# Patient Record
Sex: FEMALE | Race: BLACK OR AFRICAN AMERICAN | NOT HISPANIC OR LATINO | Employment: FULL TIME | URBAN - METROPOLITAN AREA
[De-identification: names, ages, dates, MRNs, and addresses within clinical notes are randomized per-mention and may not be internally consistent; named-entity substitution may affect disease eponyms.]

---

## 2024-10-28 ENCOUNTER — OFFICE VISIT (OUTPATIENT)
Dept: PRIMARY CARE | Facility: CLINIC | Age: 54
End: 2024-10-28
Payer: COMMERCIAL

## 2024-10-28 VITALS
BODY MASS INDEX: 31.3 KG/M2 | OXYGEN SATURATION: 100 % | DIASTOLIC BLOOD PRESSURE: 80 MMHG | SYSTOLIC BLOOD PRESSURE: 138 MMHG | WEIGHT: 199.4 LBS | HEART RATE: 64 BPM | HEIGHT: 67 IN | TEMPERATURE: 97.5 F

## 2024-10-28 DIAGNOSIS — M25.512 ACUTE PAIN OF LEFT SHOULDER: ICD-10-CM

## 2024-10-28 DIAGNOSIS — I10 BENIGN ESSENTIAL HYPERTENSION: ICD-10-CM

## 2024-10-28 DIAGNOSIS — S46.912D MUSCLE STRAIN OF LEFT UPPER ARM, SUBSEQUENT ENCOUNTER: Primary | ICD-10-CM

## 2024-10-28 PROCEDURE — 99213 OFFICE O/P EST LOW 20 MIN: CPT | Performed by: NURSE PRACTITIONER

## 2024-10-28 PROCEDURE — 3075F SYST BP GE 130 - 139MM HG: CPT | Performed by: NURSE PRACTITIONER

## 2024-10-28 PROCEDURE — 3008F BODY MASS INDEX DOCD: CPT | Performed by: NURSE PRACTITIONER

## 2024-10-28 PROCEDURE — 3079F DIAST BP 80-89 MM HG: CPT | Performed by: NURSE PRACTITIONER

## 2024-10-28 RX ORDER — CYCLOBENZAPRINE HCL 10 MG
10 TABLET ORAL NIGHTLY PRN
Qty: 30 TABLET | Refills: 0 | Status: SHIPPED | OUTPATIENT
Start: 2024-10-28 | End: 2025-01-28 | Stop reason: ALTCHOICE

## 2024-10-28 RX ORDER — MELOXICAM 15 MG/1
15 TABLET ORAL DAILY
Qty: 30 TABLET | Refills: 1 | Status: SHIPPED | OUTPATIENT
Start: 2024-10-28 | End: 2024-12-27

## 2024-10-28 ASSESSMENT — PAIN SCALES - GENERAL: PAINLEVEL_OUTOF10: 8

## 2024-10-28 NOTE — PROGRESS NOTES
"Subjective   Patient ID: Zaria Grimm is a 54 y.o. female who presents for Arm Pain (When it started: Was mentioned at last visit/Where pain is located:  L arm/Sharp, dull, burning, or pressure-like: Aching/Constant or intermittent: constant/Affecting work/activities: Hard to move sometimes, affecting things like changing clothes/What makes it worse or better: nothing helps).    Arm Pain     Presents for left upper arm and shoulder pain x week  Denies any injury or overuse  Denies any CP, SOB, radiating neck, jaw pain  Is not exertional, feels with ROM.    Review of Systems    Objective     /80 (BP Location: Right arm, Patient Position: Sitting, BP Cuff Size: Large adult)   Pulse 64   Temp 36.4 °C (97.5 °F) (Temporal)   Ht 1.702 m (5' 7\")   Wt 90.4 kg (199 lb 6.4 oz)   LMP 06/20/2022   SpO2 100%   BMI 31.23 kg/m²      Physical Exam  Vitals and nursing note reviewed.   Constitutional:       General: She is not in acute distress.     Appearance: Normal appearance.   Musculoskeletal:      Right shoulder: Normal.      Left shoulder: Swelling (posterior shoulder) and tenderness present. No bony tenderness. Decreased range of motion (due to pain). Normal strength.      Right upper arm: Normal.      Left upper arm: Tenderness present.      Right elbow: Normal.      Left elbow: Normal.      Cervical back: Normal.          Assessment/Plan   Diagnoses and all orders for this visit:  Muscle strain of left upper arm, subsequent encounter  -     Referral to Physical Therapy; Future  -     meloxicam (Mobic) 15 mg tablet; Take 1 tablet (15 mg) by mouth once daily.  Acute pain of left shoulder  -     Referral to Physical Therapy; Future  -     meloxicam (Mobic) 15 mg tablet; Take 1 tablet (15 mg) by mouth once daily.  Benign essential hypertension      Patient Instructions   Refer to Physical therapy for further evaluation and management  Start meloxicam and muscle relaxer as needed for pain and swelling in left arm " and shoulder  Time off work to rest the shoulder/arm this week  Continue to go through range of motion with elbow and shoulder until evaluated by PT.  Follow up with any changes or persisting symptoms

## 2024-10-28 NOTE — PATIENT INSTRUCTIONS
Refer to Physical therapy for further evaluation and management  Start meloxicam and muscle relaxer as needed for pain and swelling in left arm and shoulder  Time off work to rest the shoulder/arm this week  Continue to go through range of motion with elbow and shoulder until evaluated by PT.  Follow up with any changes or persisting symptoms

## 2024-10-28 NOTE — LETTER
October 28, 2024     Patient: Zaria Grimm   YOB: 1970   Date of Visit: 10/28/2024       To Whom It May Concern:    Zaria Grimm was seen in my clinic on 10/28/2024. Please excuse Zaria for her absence from work 10/28/2024 to 11/3/2024. She may return to work 11/4/2024.    If you have any questions or concerns, please don't hesitate to call.         Sincerely,         VONNIE Hoyt-CNP        CC: No Recipients

## 2024-10-29 DIAGNOSIS — M25.512 ACUTE PAIN OF LEFT SHOULDER: ICD-10-CM

## 2024-10-29 DIAGNOSIS — S46.912D MUSCLE STRAIN OF LEFT UPPER ARM, SUBSEQUENT ENCOUNTER: Primary | ICD-10-CM

## 2024-11-11 ENCOUNTER — EVALUATION (OUTPATIENT)
Dept: OCCUPATIONAL THERAPY | Facility: CLINIC | Age: 54
End: 2024-11-11
Payer: COMMERCIAL

## 2024-11-11 DIAGNOSIS — S46.912D MUSCLE STRAIN OF LEFT UPPER ARM, SUBSEQUENT ENCOUNTER: ICD-10-CM

## 2024-11-11 DIAGNOSIS — R29.898 DECREASED GRIP STRENGTH OF LEFT HAND: ICD-10-CM

## 2024-11-11 DIAGNOSIS — M25.512 ACUTE PAIN OF LEFT SHOULDER: ICD-10-CM

## 2024-11-11 DIAGNOSIS — M25.612 DECREASED ROM OF LEFT SHOULDER: Primary | ICD-10-CM

## 2024-11-11 PROBLEM — S46.912A MUSCLE STRAIN OF LEFT UPPER ARM: Status: ACTIVE | Noted: 2024-11-11

## 2024-11-11 PROCEDURE — 97165 OT EVAL LOW COMPLEX 30 MIN: CPT | Mod: GO

## 2024-11-11 PROCEDURE — 97110 THERAPEUTIC EXERCISES: CPT | Mod: GO

## 2024-11-11 ASSESSMENT — PAIN - FUNCTIONAL ASSESSMENT: PAIN_FUNCTIONAL_ASSESSMENT: 0-10

## 2024-11-11 ASSESSMENT — ENCOUNTER SYMPTOMS
OCCASIONAL FEELINGS OF UNSTEADINESS: 0
LOSS OF SENSATION IN FEET: 0
DEPRESSION: 0

## 2024-11-11 ASSESSMENT — PAIN SCALES - GENERAL: PAINLEVEL_OUTOF10: 3

## 2024-11-11 NOTE — PROGRESS NOTES
Occupational Therapy    Occupational Therapy Evaluation    Name: Zaria Grimm  MRN: [id number]  : 1970  Date: 24      Time Calculation  Start Time: 445  Stop Time: 530  Time Calculation (min): 45 min  OT Evaluation Time Entry  OT Evaluation (Low) Time Entry: 30     OT Therapeutic Procedures Time Entry  Therapeutic Exercise Time Entry: 15              Visit: 1  [facility]  Auth required  Problem List Items Addressed This Visit             ICD-10-CM    Decreased  strength of left hand R29.898    Relevant Orders    Follow Up In Occupational Therapy    Decreased ROM of left shoulder - Primary M25.612    Relevant Orders    Follow Up In Occupational Therapy    Acute pain of left shoulder M25.512    Relevant Orders    Follow Up In Occupational Therapy    Muscle strain of left upper arm S46.912A    Relevant Orders    Follow Up In Occupational Therapy     Assessment:   Patient is a 53 year old female who came to outpatient OT with dx of muscle strain of the LUE. Patient does demonstrate with tightness and TTP over dorsal aspect of L forearm and over biceps. Patient also demonstrates with decreased L shoulder ROM and overall strength impacting patient's abilities to carry out daily tasks. Patient would benefit from skilled OT to address the above impairments to maximize patient's functional abilities.     Plan:   OT POC to include: heat/ice, ultrasound, manual therapy, therapeutic exercise, therapeutic activity, HEP    1-2x  a week for 6-8 weeks  Rehab potential: Good  Patient in agreement with plan     Subjective  Patient agreeable to OT evaluation. Patient reports L arm pain began in September. Patient does not exactly know how it happened, but does report lifting heavier items at work (boxes) and stated could possibly have been from lifting (with pronated forearms.)    Current Problem:  1. Decreased ROM of left shoulder  Follow Up In Occupational Therapy      2. Muscle strain of left upper arm,  "subsequent encounter  Referral to Occupational Therapy    Follow Up In Occupational Therapy      3. Acute pain of left shoulder  Referral to Occupational Therapy    Follow Up In Occupational Therapy      4. Decreased  strength of left hand  Follow Up In Occupational Therapy        General:      General  Reason for Referral: OT eval and treat  Referred By:  (Juliette Reeder, APRN-CNP)  Preferred Learning Style: verbal, visual, written  Per Juliette Reeder's note on 10/28/24:  “Zaria Grimm is a 53 y.o. female who presents for Arm Pain (When it started: Was mentioned at last visit/Where pain is located: L arm/Sharp, dull, burning, or pressure-like: Aching/Constant or intermittent: constant/Affecting work/activities: Hard to move sometimes, affecting things like changing clothes/What makes it worse or better: nothing helps).  Patient Instructions   Refer to Physical therapy for further evaluation and management  Start meloxicam and muscle relaxer as needed for pain and swelling in left arm and shoulder  Time off work to rest the shoulder/arm this week  Continue to go through range of motion with elbow and shoulder until evaluated by PT.  Follow up with any changes or persisting symptoms”  Precautions:  Precautions  STEADI Fall Risk Score (The score of 4 or more indicates an increased risk of falling): 1  Vital Signs:   Not taken   Pain Assessment:  Pain Assessment  Pain Assessment: 0-10  0-10 (Numeric) Pain Score: 3  Pain in the LUE can get up to 7/10 at night and wake her up   Middle of the day can be a 6/10 after a lot of work and movement.   Work History:  Supervisor   Prior Level of Function:  Independent   Roles/Routines:  Likes shopping   Patient Goal:  \"To yoselyn rid of the pain\"  Personal Factors that my impact Care:   Works full time  Objective   Observation:  Pain in bicep area and goes down elbow into forearm on the dorsal aspect of the arm.  R handed  Palpation:  TTP over L bicep and dorsal aspect of L " forearm  ROM  RUE:   shoulder flexion: 0-155        shoulder abduction:   0-155             External rotation: 0-70  Internal rotation: T8  LUE:   shoulder flexion: 0- 110   shoulder abduction: 0-109        External rotation: 0-52  Internal rotation: sacrum    Strength:  RUE:   5/5  R : 45#  LUE:   shoulder flexion: 4-/5  shoulder abduction: 4-/5   External rotation: 4-/5  Internal rotation: 3+/5  : 23#  Coordination:  Intact   Sensation:  Denies parathesias   Outcome Measures:  OT Adult Other Outcome Measures  Other Outcome Measures: 24 (Quick Dash:) 29.55%    OP EDUCATION:/Treatment   Educated on modalities (heat vs ice). Educated on and completed gentle ROM stretches for dorsal aspect of L forearm, as well as gentle bicep stretch. Patient was also educated on shoulder flexion towel slide.   If patient has to lift at work, I instructed patient to lift items with a supinated forearm vs pronated forearm.    Goals:  Active       OT Goals       HEP       Start:  11/11/24    Expected End:  01/30/25       Patient will be independent with established HEP         Pain       Start:  11/11/24    Expected End:  01/30/25       Patient will report a decrease in LUE pain at nighttime (3/10 or less)         ROM       Start:  11/11/24    Expected End:  01/30/25       Patient will demonstrate increased L shoulder ROM with goniometer at 140 degrees or more of shoulder flexion and abduction, 60 degrees or more of external rotation, and internal rotation at T11 or better         Quick Dash       Start:  11/11/24    Expected End:  01/30/25       Patient will report an increase in LUE functional mobility as evident by having a 10% decrease on Quick Dash

## 2024-12-30 ENCOUNTER — DOCUMENTATION (OUTPATIENT)
Dept: OCCUPATIONAL THERAPY | Facility: CLINIC | Age: 54
End: 2024-12-30
Payer: COMMERCIAL

## 2024-12-30 NOTE — PROGRESS NOTES
Occupational Therapy    Discharge Summary    Name: Zaria Grimm  MRN: [id number]  : 1970  Date: 24    Discharge Summary: OT    Discharge Information: Date of discharge 24, Date of last visit 24, Date of evaluation 24, Number of attended visits 1, Referred by MANUEL Hoyt, and Referred for OT eval and treat; L arm pain      Rehab Discharge Reason: Patient did not call or schedule follow ups after her initial eval on 24. Will discharge the chart at this time.

## 2025-01-28 ENCOUNTER — APPOINTMENT (OUTPATIENT)
Dept: PRIMARY CARE | Facility: CLINIC | Age: 55
End: 2025-01-28
Payer: COMMERCIAL

## 2025-01-28 VITALS
HEART RATE: 78 BPM | TEMPERATURE: 97.3 F | BODY MASS INDEX: 31.23 KG/M2 | DIASTOLIC BLOOD PRESSURE: 78 MMHG | OXYGEN SATURATION: 98 % | HEIGHT: 67 IN | SYSTOLIC BLOOD PRESSURE: 124 MMHG | WEIGHT: 199 LBS

## 2025-01-28 DIAGNOSIS — E87.6 HYPOKALEMIA: Chronic | ICD-10-CM

## 2025-01-28 DIAGNOSIS — E66.811 CLASS 1 OBESITY DUE TO EXCESS CALORIES WITH SERIOUS COMORBIDITY AND BODY MASS INDEX (BMI) OF 31.0 TO 31.9 IN ADULT: ICD-10-CM

## 2025-01-28 DIAGNOSIS — E66.09 CLASS 1 OBESITY DUE TO EXCESS CALORIES WITH SERIOUS COMORBIDITY AND BODY MASS INDEX (BMI) OF 31.0 TO 31.9 IN ADULT: ICD-10-CM

## 2025-01-28 DIAGNOSIS — Z90.710 ACQUIRED ABSENCE OF BOTH CERVIX AND UTERUS: ICD-10-CM

## 2025-01-28 DIAGNOSIS — Z00.00 ROUTINE GENERAL MEDICAL EXAMINATION AT A HEALTH CARE FACILITY: Primary | ICD-10-CM

## 2025-01-28 DIAGNOSIS — I10 BENIGN ESSENTIAL HYPERTENSION: ICD-10-CM

## 2025-01-28 DIAGNOSIS — Z12.11 ENCOUNTER FOR SCREENING FOR MALIGNANT NEOPLASM OF COLON: ICD-10-CM

## 2025-01-28 DIAGNOSIS — Z11.59 NEED FOR HEPATITIS C SCREENING TEST: ICD-10-CM

## 2025-01-28 DIAGNOSIS — Z11.4 SCREENING FOR HIV (HUMAN IMMUNODEFICIENCY VIRUS): ICD-10-CM

## 2025-01-28 DIAGNOSIS — Z11.59 SCREENING FOR VIRAL DISEASE: ICD-10-CM

## 2025-01-28 LAB
POC ALBUMIN /CREATININE RATIO MANUALLY ENTERED: <30 UG/MG CREAT
POC APPEARANCE, URINE: ABNORMAL
POC BILIRUBIN, URINE: NEGATIVE
POC BLOOD, URINE: ABNORMAL
POC COLOR, URINE: YELLOW
POC GLUCOSE, URINE: NEGATIVE MG/DL
POC KETONES, URINE: NEGATIVE MG/DL
POC LEUKOCYTES, URINE: ABNORMAL
POC NITRITE,URINE: NEGATIVE
POC PH, URINE: 6 PH
POC PROTEIN, URINE: ABNORMAL MG/DL
POC SPECIFIC GRAVITY, URINE: 1.02
POC URINE ALBUMIN: 30 MG/L
POC URINE CREATININE: 200 MG/DL
POC UROBILINOGEN, URINE: 1 EU/DL

## 2025-01-28 PROCEDURE — 3008F BODY MASS INDEX DOCD: CPT | Performed by: NURSE PRACTITIONER

## 2025-01-28 PROCEDURE — 82044 UR ALBUMIN SEMIQUANTITATIVE: CPT | Performed by: NURSE PRACTITIONER

## 2025-01-28 PROCEDURE — 99396 PREV VISIT EST AGE 40-64: CPT | Performed by: NURSE PRACTITIONER

## 2025-01-28 PROCEDURE — 3074F SYST BP LT 130 MM HG: CPT | Performed by: NURSE PRACTITIONER

## 2025-01-28 PROCEDURE — 81003 URINALYSIS AUTO W/O SCOPE: CPT | Performed by: NURSE PRACTITIONER

## 2025-01-28 PROCEDURE — 3078F DIAST BP <80 MM HG: CPT | Performed by: NURSE PRACTITIONER

## 2025-01-28 RX ORDER — HYDROCHLOROTHIAZIDE 12.5 MG/1
12.5 TABLET ORAL DAILY
Qty: 90 TABLET | Refills: 1 | Status: SHIPPED | OUTPATIENT
Start: 2025-01-28 | End: 2025-07-27

## 2025-01-28 RX ORDER — POTASSIUM CHLORIDE 20 MEQ/1
20 TABLET, EXTENDED RELEASE ORAL DAILY
Qty: 90 TABLET | Refills: 1 | Status: SHIPPED | OUTPATIENT
Start: 2025-01-28 | End: 2025-07-27

## 2025-01-28 ASSESSMENT — ENCOUNTER SYMPTOMS
BLOOD IN STOOL: 0
DYSURIA: 0
BACK PAIN: 0
NAUSEA: 0
UNEXPECTED WEIGHT CHANGE: 0
MYALGIAS: 0
ABDOMINAL PAIN: 0
VOMITING: 0
DIARRHEA: 0
SHORTNESS OF BREATH: 0
FREQUENCY: 0
FATIGUE: 0
COUGH: 0
PALPITATIONS: 0
HEADACHES: 0
CONSTIPATION: 0
APPETITE CHANGE: 0
NERVOUS/ANXIOUS: 0
ARTHRALGIAS: 0

## 2025-01-28 ASSESSMENT — PAIN SCALES - GENERAL: PAINLEVEL_OUTOF10: 0-NO PAIN

## 2025-01-28 NOTE — PROGRESS NOTES
Subjective   Zaria Grimm is a 54 y.o. female and is here for a comprehensive physical exam. The patient reports no problems.      History:  LMP: Patient's last menstrual period was 06/20/2022.  Menopause at post years  Last pap date: NA s/p hysterectomy  Abnormal pap? not asked  Mammogram: 4/2024    Colonoscopy: never  Mom with colon cancer - in remission    [facility]  Does not wear glasses regularly    Last Td 8-9 years ago  Defer vaccines today    Has been following a diet since beginning of January  Walks all day at work  No formal exercise    Has avoided soda - has made some diet changes  Has avoided junk foods.  States does not feel hungry - eats small snacks and dinner  Not a lot of protein in diet    Leg pain - increased fruits    Do you have pain that bothers you in your daily life? no    Review of Systems   Constitutional:  Negative for appetite change, fatigue and unexpected weight change.   HENT:  Negative for congestion, ear pain and hearing loss.    Eyes:  Negative for visual disturbance.   Respiratory:  Negative for cough and shortness of breath.    Cardiovascular:  Negative for chest pain, palpitations and leg swelling.   Gastrointestinal:  Negative for abdominal pain, blood in stool, constipation, diarrhea, nausea and vomiting.   Genitourinary:  Negative for dysuria, frequency and urgency.   Musculoskeletal:  Negative for arthralgias, back pain and myalgias.   Skin:  Negative for rash.   Neurological:  Negative for syncope and headaches.   Psychiatric/Behavioral:  The patient is not nervous/anxious.         Objective   Physical Exam  Vitals and nursing note reviewed.   Constitutional:       General: She is not in acute distress.     Appearance: Normal appearance. She is obese. She is not toxic-appearing.   HENT:      Head: Normocephalic.      Right Ear: Tympanic membrane, ear canal and external ear normal.      Left Ear: Tympanic membrane, ear canal and external ear normal.      Nose:  Nose normal.      Mouth/Throat:      Mouth: Mucous membranes are moist.      Pharynx: Oropharynx is clear.   Eyes:      Conjunctiva/sclera: Conjunctivae normal.   Neck:      Thyroid: No thyromegaly.   Cardiovascular:      Rate and Rhythm: Normal rate and regular rhythm.      Pulses: Normal pulses.      Heart sounds: Normal heart sounds. No murmur heard.  Pulmonary:      Effort: Pulmonary effort is normal. No respiratory distress.      Breath sounds: Normal breath sounds.   Abdominal:      General: Bowel sounds are normal.      Palpations: Abdomen is soft.      Tenderness: There is no abdominal tenderness.   Musculoskeletal:         General: Normal range of motion.      Cervical back: Neck supple.      Right lower leg: No edema.      Left lower leg: No edema.   Lymphadenopathy:      Cervical: No cervical adenopathy.   Skin:     General: Skin is warm and dry.      Capillary Refill: Capillary refill takes less than 2 seconds.      Findings: No rash.   Neurological:      General: No focal deficit present.      Gait: Gait normal.   Psychiatric:         Mood and Affect: Mood normal.         Judgment: Judgment normal.         Assessment/Plan   Healthy female exam.     1. Schedule colonoscopy due to family history  2. Patient Counseling:  --Nutrition: Stressed importance of moderation in sodium/caffeine intake, saturated fat and cholesterol, caloric balance, sufficient intake of fresh fruits, vegetables, fiber, calcium, iron, and 1 mg of folate supplement per day (for females capable of pregnancy).  --Discussed the issue of estrogen replacement, calcium supplement, and the daily use of baby aspirin.  --Exercise: Stressed the importance of regular exercise.   --Substance Abuse: Discussed cessation/primary prevention of tobacco, alcohol, or other drug use; driving or other dangerous activities under the influence; availability of treatment for abuse.    --Sexuality: Discussed sexually transmitted diseases, partner selection,  use of condoms, avoidance of unintended pregnancy  and contraceptive alternatives.   --Injury prevention: Discussed safety belts, safety helmets, smoke detector, smoking near bedding or upholstery.   --Dental health: Discussed importance of regular tooth brushing, flossing, and dental visits.  --Immunizations reviewed.  --Discussed benefits of screening colonoscopy.  --After hours service discussed with patient  3. Discussed the patient's BMI with her.  The BMI is above average. The patient received Provided instructions on dietary changes  Provided instructions on exercise  Advised to Increase physical activity because they have an above normal BMI.  4. Follow up next physical in 1 year    Patient Instructions   It is important that you have yearly check-ups with your healthcare provider to ensure that you stay up to date on your health, screenings, and vaccinations, even if you feel healthy.     Make sure you eat a diet rich in fruits, vegetables, nuts, beans, whole grains, lean meat, eggs, calcium, and good fats (i.e. olive oil, avocado baked fish). AVOID a diet that is high in red meat, trans- and saturated fats, sweetened beverages, and refined grains (i.e. white bread, rice, sweetened cereals).   Be sure you are increasing protein in your diet at meals and snacks.    Stay hydrated with at least 64 ounces of water daily.    Exercise most days per week, for at least 30-45 minutes per session.     Be sure to wear sunscreen of SPF of 15 or higher if you are going to be outside.    Stress management and coping skills are important to manage our stress levels. Some tips include keep in mind that stress isn't a bad thing, talk about your problems, prioritize your responsibilities, focus on the basics, don't put all your eggs in one basket, set aside time for yourself, and keep things in perspective.     Stay up to date with annual eye exams and twice yearly dental exams.    Recommend annual flu vaccination, in  addition to age appropriate recommended vaccines.    Annual mammogram is recommended for women 40-75  Due in April    Colon cancer screening based on age, family history, and other risk factors.  Please call to schedule    Continue current medications as prescribed.  Follow a low salt diet  Check blood pressure at home  Goal BP less than 135/85  Recommend regular aerobic exercise at least 30 minutes a day at least 5 days a week for cardiovascular health  Maintain a healthy weight    Fasting blood work ordered    Follow up in 6 months for evaluation of therapy

## 2025-01-28 NOTE — PATIENT INSTRUCTIONS
It is important that you have yearly check-ups with your healthcare provider to ensure that you stay up to date on your health, screenings, and vaccinations, even if you feel healthy.     Make sure you eat a diet rich in fruits, vegetables, nuts, beans, whole grains, lean meat, eggs, calcium, and good fats (i.e. olive oil, avocado baked fish). AVOID a diet that is high in red meat, trans- and saturated fats, sweetened beverages, and refined grains (i.e. white bread, rice, sweetened cereals).   Be sure you are increasing protein in your diet at meals and snacks.    Stay hydrated with at least 64 ounces of water daily.    Exercise most days per week, for at least 30-45 minutes per session.     Be sure to wear sunscreen of SPF of 15 or higher if you are going to be outside.    Stress management and coping skills are important to manage our stress levels. Some tips include keep in mind that stress isn't a bad thing, talk about your problems, prioritize your responsibilities, focus on the basics, don't put all your eggs in one basket, set aside time for yourself, and keep things in perspective.     Stay up to date with annual eye exams and twice yearly dental exams.    Recommend annual flu vaccination, in addition to age appropriate recommended vaccines.    Annual mammogram is recommended for women 40-75  Due in April    Colon cancer screening based on age, family history, and other risk factors.  Please call to schedule    Continue current medications as prescribed.  Follow a low salt diet  Check blood pressure at home  Goal BP less than 135/85  Recommend regular aerobic exercise at least 30 minutes a day at least 5 days a week for cardiovascular health  Maintain a healthy weight    Fasting blood work ordered    Follow up in 6 months for evaluation of therapy

## 2025-01-29 LAB
ALBUMIN SERPL-MCNC: 4.3 G/DL (ref 3.6–5.1)
ALP SERPL-CCNC: 69 U/L (ref 37–153)
ALT SERPL-CCNC: 19 U/L (ref 6–29)
ANION GAP SERPL CALCULATED.4IONS-SCNC: 8 MMOL/L (CALC) (ref 7–17)
AST SERPL-CCNC: 20 U/L (ref 10–35)
BILIRUB SERPL-MCNC: 0.6 MG/DL (ref 0.2–1.2)
BUN SERPL-MCNC: 11 MG/DL (ref 7–25)
CALCIUM SERPL-MCNC: 9.3 MG/DL (ref 8.6–10.4)
CHLORIDE SERPL-SCNC: 104 MMOL/L (ref 98–110)
CHOLEST SERPL-MCNC: 244 MG/DL
CHOLEST/HDLC SERPL: 3.3 (CALC)
CO2 SERPL-SCNC: 29 MMOL/L (ref 20–32)
CREAT SERPL-MCNC: 0.98 MG/DL (ref 0.5–1.03)
EGFRCR SERPLBLD CKD-EPI 2021: 69 ML/MIN/1.73M2
ERYTHROCYTE [DISTWIDTH] IN BLOOD BY AUTOMATED COUNT: 14.1 % (ref 11–15)
FERRITIN SERPL-MCNC: 66 NG/ML (ref 16–232)
GLUCOSE SERPL-MCNC: 89 MG/DL (ref 65–99)
HCT VFR BLD AUTO: 42.7 % (ref 35–45)
HCV AB SERPL QL IA: NORMAL
HDLC SERPL-MCNC: 75 MG/DL
HGB BLD-MCNC: 14.3 G/DL (ref 11.7–15.5)
HIV 1+2 AB+HIV1 P24 AG SERPL QL IA: NORMAL
LDLC SERPL CALC-MCNC: 153 MG/DL (CALC)
MCH RBC QN AUTO: 27.3 PG (ref 27–33)
MCHC RBC AUTO-ENTMCNC: 33.5 G/DL (ref 32–36)
MCV RBC AUTO: 81.5 FL (ref 80–100)
NONHDLC SERPL-MCNC: 169 MG/DL (CALC)
PLATELET # BLD AUTO: 390 THOUSAND/UL (ref 140–400)
PMV BLD REES-ECKER: 10.9 FL (ref 7.5–12.5)
POTASSIUM SERPL-SCNC: 3.5 MMOL/L (ref 3.5–5.3)
PROT SERPL-MCNC: 7.5 G/DL (ref 6.1–8.1)
RBC # BLD AUTO: 5.24 MILLION/UL (ref 3.8–5.1)
SODIUM SERPL-SCNC: 141 MMOL/L (ref 135–146)
TRIGL SERPL-MCNC: 69 MG/DL
VIT B12 SERPL-MCNC: 327 PG/ML (ref 200–1100)
WBC # BLD AUTO: 6 THOUSAND/UL (ref 3.8–10.8)

## 2025-04-08 ENCOUNTER — TELEPHONE (OUTPATIENT)
Dept: PRIMARY CARE | Facility: CLINIC | Age: 55
End: 2025-04-08
Payer: COMMERCIAL

## 2025-04-09 DIAGNOSIS — Z12.39 BREAST SCREENING: Primary | ICD-10-CM

## 2025-04-17 ENCOUNTER — HOSPITAL ENCOUNTER (OUTPATIENT)
Dept: RADIOLOGY | Facility: CLINIC | Age: 55
Discharge: HOME | End: 2025-04-17
Payer: COMMERCIAL

## 2025-04-17 VITALS — WEIGHT: 199.08 LBS | HEIGHT: 67 IN | BODY MASS INDEX: 31.25 KG/M2

## 2025-04-17 DIAGNOSIS — Z12.39 BREAST SCREENING: ICD-10-CM

## 2025-04-17 PROCEDURE — 77067 SCR MAMMO BI INCL CAD: CPT

## 2025-04-17 PROCEDURE — 77063 BREAST TOMOSYNTHESIS BI: CPT | Performed by: RADIOLOGY

## 2025-04-17 PROCEDURE — 77067 SCR MAMMO BI INCL CAD: CPT | Performed by: RADIOLOGY

## 2025-09-01 DIAGNOSIS — I10 BENIGN ESSENTIAL HYPERTENSION: ICD-10-CM

## 2025-09-01 RX ORDER — HYDROCHLOROTHIAZIDE 12.5 MG/1
TABLET ORAL
Qty: 90 TABLET | Refills: 0 | Status: SHIPPED | OUTPATIENT
Start: 2025-09-01 | End: 2025-09-03 | Stop reason: SDUPTHER

## 2025-09-03 ENCOUNTER — OFFICE VISIT (OUTPATIENT)
Dept: PRIMARY CARE | Facility: CLINIC | Age: 55
End: 2025-09-03
Payer: COMMERCIAL

## 2025-09-03 VITALS
HEART RATE: 78 BPM | HEIGHT: 67 IN | SYSTOLIC BLOOD PRESSURE: 142 MMHG | WEIGHT: 195.2 LBS | BODY MASS INDEX: 30.64 KG/M2 | OXYGEN SATURATION: 97 % | DIASTOLIC BLOOD PRESSURE: 78 MMHG | TEMPERATURE: 97.5 F

## 2025-09-03 DIAGNOSIS — E66.09 CLASS 1 OBESITY DUE TO EXCESS CALORIES WITH SERIOUS COMORBIDITY AND BODY MASS INDEX (BMI) OF 31.0 TO 31.9 IN ADULT: ICD-10-CM

## 2025-09-03 DIAGNOSIS — N94.10 DYSPAREUNIA, FEMALE: ICD-10-CM

## 2025-09-03 DIAGNOSIS — E66.811 CLASS 1 OBESITY DUE TO EXCESS CALORIES WITH SERIOUS COMORBIDITY AND BODY MASS INDEX (BMI) OF 31.0 TO 31.9 IN ADULT: ICD-10-CM

## 2025-09-03 DIAGNOSIS — Z12.11 ENCOUNTER FOR SCREENING FOR MALIGNANT NEOPLASM OF COLON: ICD-10-CM

## 2025-09-03 DIAGNOSIS — G47.33 OSA ON CPAP: ICD-10-CM

## 2025-09-03 DIAGNOSIS — N63.0 BREAST LUMP IN UPPER INNER QUADRANT: ICD-10-CM

## 2025-09-03 DIAGNOSIS — I10 BENIGN ESSENTIAL HYPERTENSION: Primary | ICD-10-CM

## 2025-09-03 PROCEDURE — 3078F DIAST BP <80 MM HG: CPT | Performed by: NURSE PRACTITIONER

## 2025-09-03 PROCEDURE — 3077F SYST BP >= 140 MM HG: CPT | Performed by: NURSE PRACTITIONER

## 2025-09-03 PROCEDURE — 1036F TOBACCO NON-USER: CPT | Performed by: NURSE PRACTITIONER

## 2025-09-03 PROCEDURE — 3008F BODY MASS INDEX DOCD: CPT | Performed by: NURSE PRACTITIONER

## 2025-09-03 PROCEDURE — 99214 OFFICE O/P EST MOD 30 MIN: CPT | Performed by: NURSE PRACTITIONER

## 2025-09-03 RX ORDER — ESTRADIOL 0.1 MG/G
2 CREAM VAGINAL DAILY
Qty: 42.5 G | Refills: 5 | Status: SHIPPED | OUTPATIENT
Start: 2025-09-03 | End: 2025-09-04 | Stop reason: SDUPTHER

## 2025-09-03 RX ORDER — HYDROCHLOROTHIAZIDE 12.5 MG/1
12.5 TABLET ORAL DAILY
Qty: 90 TABLET | Refills: 1 | Status: SHIPPED | OUTPATIENT
Start: 2025-09-03 | End: 2026-03-02

## 2025-09-03 ASSESSMENT — PAIN SCALES - GENERAL: PAINLEVEL_OUTOF10: 0-NO PAIN

## 2025-09-04 DIAGNOSIS — N94.10 DYSPAREUNIA, FEMALE: ICD-10-CM

## 2025-09-04 RX ORDER — ESTRADIOL 0.1 MG/G
CREAM VAGINAL
Qty: 42.5 G | Refills: 5 | Status: SHIPPED | OUTPATIENT
Start: 2025-09-04